# Patient Record
Sex: MALE | Race: WHITE | NOT HISPANIC OR LATINO | URBAN - METROPOLITAN AREA
[De-identification: names, ages, dates, MRNs, and addresses within clinical notes are randomized per-mention and may not be internally consistent; named-entity substitution may affect disease eponyms.]

---

## 2019-12-04 ENCOUNTER — OFFICE VISIT (OUTPATIENT)
Dept: URGENT CARE | Facility: CLINIC | Age: 59
End: 2019-12-04
Payer: COMMERCIAL

## 2019-12-04 VITALS
HEIGHT: 70 IN | SYSTOLIC BLOOD PRESSURE: 152 MMHG | DIASTOLIC BLOOD PRESSURE: 102 MMHG | BODY MASS INDEX: 33.44 KG/M2 | RESPIRATION RATE: 14 BRPM | WEIGHT: 233.6 LBS | OXYGEN SATURATION: 95 % | TEMPERATURE: 96.9 F | HEART RATE: 65 BPM

## 2019-12-04 DIAGNOSIS — R42 LIGHTHEADEDNESS: ICD-10-CM

## 2019-12-04 PROCEDURE — 99203 OFFICE O/P NEW LOW 30 MIN: CPT | Performed by: PHYSICIAN ASSISTANT

## 2019-12-04 ASSESSMENT — ENCOUNTER SYMPTOMS
SENSORY CHANGE: 0
LOSS OF CONSCIOUSNESS: 0
SPEECH CHANGE: 0
FOCAL WEAKNESS: 0
COUGH: 0
CHILLS: 0
SEIZURES: 0
DIZZINESS: 1
FEVER: 0
TREMORS: 0
VOMITING: 0
NAUSEA: 0
HEADACHES: 0
WEAKNESS: 0
MYALGIAS: 0
CARDIOVASCULAR NEGATIVE: 1

## 2019-12-04 NOTE — PROGRESS NOTES
"Subjective:   Aristeo Goldman is a 58 y.o. male who presents for Dizziness (xtoday, a little light headed when bending over)        Pt complains of feeling \"light headed\" today. He stood up and felt a bit unstable this morning.  Denies vertigo, loss of consciousness, nausea, vomiting, fevers, chills.  Symptoms exacerbated by bending over and then standing up rapidly. -140/.  Is not taking any antihypertensive medication. Drinking fluids. Not taking any new medications. Pt had this sensation in the past when BP was \"bottoming out\".  Patient states that he has had issues with his blood pressure fluctuating greatly over the last couple years.  He feels that his blood pressure has been creeping up and believes he may need to restart antihypertensives.  He has had extensive work-ups in the past for lightheadedness and vertigo.  He states that he has had CT scans of his head as well as extensive laboratory testing to evaluate for hormonal abnormalities. Imaging was negative.  He states at one point his cortisol and testosterone levels were low several years ago.  For short time he was started on supplemental hydrocortisone and testosterone, but these were eventually discontinued.  Patient is visiting from [place]-he returns home on Sunday.    Review of Systems   Constitutional: Negative for chills, fever and malaise/fatigue.   HENT: Negative for congestion.    Respiratory: Negative for cough.    Cardiovascular: Negative.    Gastrointestinal: Negative for nausea and vomiting.   Musculoskeletal: Negative for myalgias.   Neurological: Positive for dizziness. Negative for tremors, sensory change, speech change, focal weakness, seizures, loss of consciousness, weakness and headaches.       PMH:  has a past medical history of Hypertension.  MEDS: No current outpatient medications on file.  ALLERGIES: No Known Allergies  SURGHX: History reviewed. No pertinent surgical history.  SOCHX:  reports that he has never " "smoked. He has never used smokeless tobacco.  FH: Family history was reviewed, no pertinent findings to report   Objective:   /102 (BP Location: Left arm, Patient Position: Standing, BP Cuff Size: Large adult)   Pulse 65   Temp 36.1 °C (96.9 °F) (Temporal)   Resp 14   Ht 1.778 m (5' 10\")   Wt 106 kg (233 lb 9.6 oz)   SpO2 95%   BMI 33.52 kg/m²   Physical Exam  Vitals signs reviewed.   Constitutional:       General: He is not in acute distress.     Appearance: Normal appearance. He is well-developed. He is not toxic-appearing.   HENT:      Head: Normocephalic and atraumatic.      Right Ear: Tympanic membrane, ear canal and external ear normal.      Left Ear: Tympanic membrane, ear canal and external ear normal.      Nose: Nose normal. No mucosal edema or rhinorrhea.      Mouth/Throat:      Lips: Pink.      Mouth: Mucous membranes are moist.      Pharynx: Oropharynx is clear.   Eyes:      General: Lids are normal.      Conjunctiva/sclera: Conjunctivae normal.   Neck:      Musculoskeletal: Neck supple.   Cardiovascular:      Rate and Rhythm: Normal rate and regular rhythm.      Heart sounds: Normal heart sounds, S1 normal and S2 normal. No murmur. No friction rub. No gallop.    Pulmonary:      Effort: Pulmonary effort is normal. No respiratory distress.      Breath sounds: Normal breath sounds. No decreased breath sounds, wheezing, rhonchi or rales.   Musculoskeletal:      Comments: No lower extremity edema.   Skin:     General: Skin is warm and dry.      Capillary Refill: Capillary refill takes less than 2 seconds.      Comments: No increased pigmentation on arms or hands.   Neurological:      Mental Status: He is alert and oriented to person, place, and time.      Cranial Nerves: No cranial nerve deficit.      Sensory: No sensory deficit.   Psychiatric:         Speech: Speech normal.         Behavior: Behavior normal.         Thought Content: Thought content normal.         Judgment: Judgment normal. "           Assessment/Plan:   1. Lightheadedness  - Orthostatic Blood Pressure    Blood pressure rechecked.  Patient's blood pressure 146/96.  Physical exam is unremarkable.  Considerations to include viral illness, elevation changes, metabolic causes, cardiac causes discussed with patient.  Patient states that he had a full cardiac work-up earlier this year to include stress testing.  Work-up was negative.  Additionally cardiac auscultation is within normal limits.  He is not having any chest pain, palpitations, shortness of breath or dyspnea.  Clinical suspicion for cardiac cause is low.  Patient declines additional lab work-up to evaluate for metabolic causes-he will see his PCP when he returns home to have this further evaluated.  As patient symptoms are mild I feel that this is reasonable.  Patient also advised that this could be a symptom of a disease process that has not yet fully manifested itself.  That being said, patient was given strict return and ED precautions.  If his symptoms persist, worsen or new symptoms develop during his stay I would like him to return to clinic for reevaluation.  For severe symptoms he should go to the emergency room.      Differential diagnosis, natural history, supportive care, and indications for immediate follow-up discussed.